# Patient Record
Sex: MALE | Race: OTHER | NOT HISPANIC OR LATINO | URBAN - METROPOLITAN AREA
[De-identification: names, ages, dates, MRNs, and addresses within clinical notes are randomized per-mention and may not be internally consistent; named-entity substitution may affect disease eponyms.]

---

## 2023-04-07 ENCOUNTER — APPOINTMENT (OUTPATIENT)
Dept: LAB | Facility: CLINIC | Age: 63
End: 2023-04-07

## 2023-04-07 DIAGNOSIS — E55.9 VITAMIN D DEFICIENCY: ICD-10-CM

## 2023-04-07 DIAGNOSIS — I10 BENIGN HYPERTENSION: ICD-10-CM

## 2023-04-07 DIAGNOSIS — E78.2 MIXED HYPERLIPIDEMIA: ICD-10-CM

## 2023-04-07 DIAGNOSIS — Z12.5 SPECIAL SCREENING FOR MALIGNANT NEOPLASM OF PROSTATE: ICD-10-CM

## 2023-04-07 LAB
25(OH)D3 SERPL-MCNC: 13 NG/ML (ref 30–100)
ALBUMIN SERPL BCP-MCNC: 3.9 G/DL (ref 3.5–5)
ALP SERPL-CCNC: 59 U/L (ref 46–116)
ALT SERPL W P-5'-P-CCNC: 27 U/L (ref 12–78)
ANION GAP SERPL CALCULATED.3IONS-SCNC: -1 MMOL/L (ref 4–13)
AST SERPL W P-5'-P-CCNC: 28 U/L (ref 5–45)
BASOPHILS # BLD AUTO: 0.02 THOUSANDS/ÂΜL (ref 0–0.1)
BASOPHILS NFR BLD AUTO: 1 % (ref 0–1)
BILIRUB SERPL-MCNC: 0.99 MG/DL (ref 0.2–1)
BUN SERPL-MCNC: 18 MG/DL (ref 5–25)
CALCIUM SERPL-MCNC: 8.9 MG/DL (ref 8.3–10.1)
CHLORIDE SERPL-SCNC: 106 MMOL/L (ref 96–108)
CHOLEST SERPL-MCNC: 168 MG/DL
CO2 SERPL-SCNC: 29 MMOL/L (ref 21–32)
CREAT SERPL-MCNC: 0.95 MG/DL (ref 0.6–1.3)
EOSINOPHIL # BLD AUTO: 0.06 THOUSAND/ÂΜL (ref 0–0.61)
EOSINOPHIL NFR BLD AUTO: 2 % (ref 0–6)
ERYTHROCYTE [DISTWIDTH] IN BLOOD BY AUTOMATED COUNT: 13 % (ref 11.6–15.1)
GFR SERPL CREATININE-BSD FRML MDRD: 84 ML/MIN/1.73SQ M
GLUCOSE P FAST SERPL-MCNC: 101 MG/DL (ref 65–99)
HCT VFR BLD AUTO: 46.7 % (ref 36.5–49.3)
HDLC SERPL-MCNC: 41 MG/DL
HGB BLD-MCNC: 15 G/DL (ref 12–17)
IMM GRANULOCYTES # BLD AUTO: 0.01 THOUSAND/UL (ref 0–0.2)
IMM GRANULOCYTES NFR BLD AUTO: 0 % (ref 0–2)
LDLC SERPL CALC-MCNC: 107 MG/DL (ref 0–100)
LYMPHOCYTES # BLD AUTO: 1.05 THOUSANDS/ÂΜL (ref 0.6–4.47)
LYMPHOCYTES NFR BLD AUTO: 28 % (ref 14–44)
MCH RBC QN AUTO: 29.9 PG (ref 26.8–34.3)
MCHC RBC AUTO-ENTMCNC: 32.1 G/DL (ref 31.4–37.4)
MCV RBC AUTO: 93 FL (ref 82–98)
MONOCYTES # BLD AUTO: 0.43 THOUSAND/ÂΜL (ref 0.17–1.22)
MONOCYTES NFR BLD AUTO: 12 % (ref 4–12)
NEUTROPHILS # BLD AUTO: 2.14 THOUSANDS/ÂΜL (ref 1.85–7.62)
NEUTS SEG NFR BLD AUTO: 57 % (ref 43–75)
NONHDLC SERPL-MCNC: 127 MG/DL
NRBC BLD AUTO-RTO: 0 /100 WBCS
PLATELET # BLD AUTO: 163 THOUSANDS/UL (ref 149–390)
PMV BLD AUTO: 11.3 FL (ref 8.9–12.7)
POTASSIUM SERPL-SCNC: 4.4 MMOL/L (ref 3.5–5.3)
PROT SERPL-MCNC: 7.4 G/DL (ref 6.4–8.4)
PSA SERPL-MCNC: 1.4 NG/ML (ref 0–4)
RBC # BLD AUTO: 5.01 MILLION/UL (ref 3.88–5.62)
SODIUM SERPL-SCNC: 134 MMOL/L (ref 135–147)
TRIGL SERPL-MCNC: 101 MG/DL
WBC # BLD AUTO: 3.71 THOUSAND/UL (ref 4.31–10.16)

## 2024-03-14 ENCOUNTER — APPOINTMENT (OUTPATIENT)
Dept: LAB | Facility: CLINIC | Age: 64
End: 2024-03-14

## 2024-03-22 ENCOUNTER — LAB (OUTPATIENT)
Dept: LAB | Facility: CLINIC | Age: 64
End: 2024-03-22
Payer: MEDICARE

## 2024-03-22 DIAGNOSIS — A04.8 H. PYLORI INFECTION: ICD-10-CM

## 2024-03-22 DIAGNOSIS — I10 BENIGN HYPERTENSION: ICD-10-CM

## 2024-03-22 DIAGNOSIS — E78.2 MIXED HYPERLIPIDEMIA: ICD-10-CM

## 2024-03-22 DIAGNOSIS — E04.1 THYROID NODULE: ICD-10-CM

## 2024-03-22 LAB
ALBUMIN SERPL BCP-MCNC: 4.4 G/DL (ref 3.5–5)
ALP SERPL-CCNC: 58 U/L (ref 34–104)
ALT SERPL W P-5'-P-CCNC: 20 U/L (ref 7–52)
ANION GAP SERPL CALCULATED.3IONS-SCNC: 6 MMOL/L (ref 4–13)
AST SERPL W P-5'-P-CCNC: 28 U/L (ref 13–39)
BASOPHILS # BLD AUTO: 0.02 THOUSANDS/ÂΜL (ref 0–0.1)
BASOPHILS NFR BLD AUTO: 1 % (ref 0–1)
BILIRUB SERPL-MCNC: 1.02 MG/DL (ref 0.2–1)
BUN SERPL-MCNC: 16 MG/DL (ref 5–25)
CALCIUM SERPL-MCNC: 9.3 MG/DL (ref 8.4–10.2)
CHLORIDE SERPL-SCNC: 102 MMOL/L (ref 96–108)
CHOLEST SERPL-MCNC: 186 MG/DL
CK SERPL-CCNC: 248 U/L (ref 39–308)
CO2 SERPL-SCNC: 32 MMOL/L (ref 21–32)
CREAT SERPL-MCNC: 0.85 MG/DL (ref 0.6–1.3)
EOSINOPHIL # BLD AUTO: 0.1 THOUSAND/ÂΜL (ref 0–0.61)
EOSINOPHIL NFR BLD AUTO: 2 % (ref 0–6)
ERYTHROCYTE [DISTWIDTH] IN BLOOD BY AUTOMATED COUNT: 13.1 % (ref 11.6–15.1)
GFR SERPL CREATININE-BSD FRML MDRD: 91 ML/MIN/1.73SQ M
GLUCOSE P FAST SERPL-MCNC: 105 MG/DL (ref 65–99)
HCT VFR BLD AUTO: 48.7 % (ref 36.5–49.3)
HDLC SERPL-MCNC: 51 MG/DL
HGB BLD-MCNC: 15.9 G/DL (ref 12–17)
IMM GRANULOCYTES # BLD AUTO: 0 THOUSAND/UL (ref 0–0.2)
IMM GRANULOCYTES NFR BLD AUTO: 0 % (ref 0–2)
LDLC SERPL CALC-MCNC: 106 MG/DL (ref 0–100)
LYMPHOCYTES # BLD AUTO: 1.26 THOUSANDS/ÂΜL (ref 0.6–4.47)
LYMPHOCYTES NFR BLD AUTO: 30 % (ref 14–44)
MCH RBC QN AUTO: 30.8 PG (ref 26.8–34.3)
MCHC RBC AUTO-ENTMCNC: 32.6 G/DL (ref 31.4–37.4)
MCV RBC AUTO: 94 FL (ref 82–98)
MONOCYTES # BLD AUTO: 0.36 THOUSAND/ÂΜL (ref 0.17–1.22)
MONOCYTES NFR BLD AUTO: 9 % (ref 4–12)
NEUTROPHILS # BLD AUTO: 2.42 THOUSANDS/ÂΜL (ref 1.85–7.62)
NEUTS SEG NFR BLD AUTO: 58 % (ref 43–75)
NONHDLC SERPL-MCNC: 135 MG/DL
NRBC BLD AUTO-RTO: 0 /100 WBCS
PLATELET # BLD AUTO: 169 THOUSANDS/UL (ref 149–390)
PMV BLD AUTO: 11.1 FL (ref 8.9–12.7)
POTASSIUM SERPL-SCNC: 4.1 MMOL/L (ref 3.5–5.3)
PROT SERPL-MCNC: 7.2 G/DL (ref 6.4–8.4)
RBC # BLD AUTO: 5.17 MILLION/UL (ref 3.88–5.62)
SODIUM SERPL-SCNC: 140 MMOL/L (ref 135–147)
TRIGL SERPL-MCNC: 144 MG/DL
TSH SERPL DL<=0.05 MIU/L-ACNC: 0.57 UIU/ML (ref 0.45–4.5)
WBC # BLD AUTO: 4.16 THOUSAND/UL (ref 4.31–10.16)

## 2024-03-22 PROCEDURE — 80053 COMPREHEN METABOLIC PANEL: CPT

## 2024-03-22 PROCEDURE — 84443 ASSAY THYROID STIM HORMONE: CPT

## 2024-03-22 PROCEDURE — 87338 HPYLORI STOOL AG IA: CPT

## 2024-03-22 PROCEDURE — 82550 ASSAY OF CK (CPK): CPT

## 2024-03-22 PROCEDURE — 80061 LIPID PANEL: CPT

## 2024-03-22 PROCEDURE — 36415 COLL VENOUS BLD VENIPUNCTURE: CPT

## 2024-03-22 PROCEDURE — 85025 COMPLETE CBC W/AUTO DIFF WBC: CPT

## 2024-03-23 LAB — H PYLORI AG STL QL IA: NEGATIVE

## 2024-10-14 ENCOUNTER — LAB (OUTPATIENT)
Dept: LAB | Facility: HOSPITAL | Age: 64
End: 2024-10-14
Payer: MEDICARE

## 2024-10-14 DIAGNOSIS — I10 BENIGN HYPERTENSION: ICD-10-CM

## 2024-10-14 DIAGNOSIS — E78.2 MIXED HYPERLIPIDEMIA: Primary | ICD-10-CM

## 2024-10-14 LAB
ALBUMIN SERPL BCG-MCNC: 4.4 G/DL (ref 3.5–5)
ALP SERPL-CCNC: 57 U/L (ref 34–104)
ALT SERPL W P-5'-P-CCNC: 16 U/L (ref 7–52)
ANION GAP SERPL CALCULATED.3IONS-SCNC: 8 MMOL/L (ref 4–13)
AST SERPL W P-5'-P-CCNC: 20 U/L (ref 13–39)
BASOPHILS # BLD AUTO: 0.03 THOUSANDS/ΜL (ref 0–0.1)
BASOPHILS NFR BLD AUTO: 1 % (ref 0–1)
BILIRUB SERPL-MCNC: 0.58 MG/DL (ref 0.2–1)
BUN SERPL-MCNC: 18 MG/DL (ref 5–25)
CALCIUM SERPL-MCNC: 9.2 MG/DL (ref 8.4–10.2)
CHLORIDE SERPL-SCNC: 103 MMOL/L (ref 96–108)
CHOLEST SERPL-MCNC: 179 MG/DL
CK SERPL-CCNC: 140 U/L (ref 39–308)
CO2 SERPL-SCNC: 29 MMOL/L (ref 21–32)
CREAT SERPL-MCNC: 0.88 MG/DL (ref 0.6–1.3)
EOSINOPHIL # BLD AUTO: 0.1 THOUSAND/ΜL (ref 0–0.61)
EOSINOPHIL NFR BLD AUTO: 2 % (ref 0–6)
ERYTHROCYTE [DISTWIDTH] IN BLOOD BY AUTOMATED COUNT: 13.2 % (ref 11.6–15.1)
GFR SERPL CREATININE-BSD FRML MDRD: 89 ML/MIN/1.73SQ M
GLUCOSE P FAST SERPL-MCNC: 113 MG/DL (ref 65–99)
HCT VFR BLD AUTO: 45.4 % (ref 36.5–49.3)
HDLC SERPL-MCNC: 51 MG/DL
HGB BLD-MCNC: 15.7 G/DL (ref 12–17)
IMM GRANULOCYTES # BLD AUTO: 0.01 THOUSAND/UL (ref 0–0.2)
IMM GRANULOCYTES NFR BLD AUTO: 0 % (ref 0–2)
LDLC SERPL CALC-MCNC: 106 MG/DL (ref 0–100)
LYMPHOCYTES # BLD AUTO: 1.5 THOUSANDS/ΜL (ref 0.6–4.47)
LYMPHOCYTES NFR BLD AUTO: 34 % (ref 14–44)
MCH RBC QN AUTO: 31.2 PG (ref 26.8–34.3)
MCHC RBC AUTO-ENTMCNC: 34.6 G/DL (ref 31.4–37.4)
MCV RBC AUTO: 90 FL (ref 82–98)
MONOCYTES # BLD AUTO: 0.41 THOUSAND/ΜL (ref 0.17–1.22)
MONOCYTES NFR BLD AUTO: 9 % (ref 4–12)
NEUTROPHILS # BLD AUTO: 2.36 THOUSANDS/ΜL (ref 1.85–7.62)
NEUTS SEG NFR BLD AUTO: 54 % (ref 43–75)
NONHDLC SERPL-MCNC: 128 MG/DL
NRBC BLD AUTO-RTO: 0 /100 WBCS
PLATELET # BLD AUTO: 158 THOUSANDS/UL (ref 149–390)
PMV BLD AUTO: 10.8 FL (ref 8.9–12.7)
POTASSIUM SERPL-SCNC: 4.1 MMOL/L (ref 3.5–5.3)
PROT SERPL-MCNC: 7.3 G/DL (ref 6.4–8.4)
RBC # BLD AUTO: 5.04 MILLION/UL (ref 3.88–5.62)
SODIUM SERPL-SCNC: 140 MMOL/L (ref 135–147)
TRIGL SERPL-MCNC: 109 MG/DL
WBC # BLD AUTO: 4.41 THOUSAND/UL (ref 4.31–10.16)

## 2024-10-14 PROCEDURE — 85025 COMPLETE CBC W/AUTO DIFF WBC: CPT

## 2024-10-14 PROCEDURE — 36415 COLL VENOUS BLD VENIPUNCTURE: CPT

## 2024-10-14 PROCEDURE — 80061 LIPID PANEL: CPT

## 2024-10-14 PROCEDURE — 80053 COMPREHEN METABOLIC PANEL: CPT

## 2024-10-14 PROCEDURE — 82550 ASSAY OF CK (CPK): CPT

## 2025-01-30 ENCOUNTER — APPOINTMENT (OUTPATIENT)
Dept: RADIOLOGY | Facility: OTHER | Age: 65
End: 2025-01-30
Payer: MEDICARE

## 2025-01-30 VITALS — HEIGHT: 69 IN | WEIGHT: 206 LBS | BODY MASS INDEX: 30.51 KG/M2

## 2025-01-30 DIAGNOSIS — M25.511 RIGHT SHOULDER PAIN, UNSPECIFIED CHRONICITY: ICD-10-CM

## 2025-01-30 PROCEDURE — 73030 X-RAY EXAM OF SHOULDER: CPT

## 2025-01-30 PROCEDURE — 99203 OFFICE O/P NEW LOW 30 MIN: CPT | Performed by: FAMILY MEDICINE

## 2025-01-30 NOTE — PATIENT INSTRUCTIONS
Patient advised that his physical exam and history suggest a possible torn rotator cuff vs tendinitis. Patient showed mild weakness on his abduction, empty can, and external rotation. Patient advised to follow up after MRI results. Pending his results he may require surgery vs conservative management.

## 2025-01-30 NOTE — PROGRESS NOTES
1. Right shoulder pain, unspecified chronicity  Ambulatory Referral to Orthopedic Surgery    XR shoulder 2+ vw right    MRI shoulder right wo contrast        Orders Placed This Encounter   Procedures    XR shoulder 2+ vw right    MRI shoulder right wo contrast        IMAGING STUDIES: (I personally reviewed images in PACS and report):  - 1/30/2025 Rt shoulder: No abnormal findings and no OA.    PAST REPORTS:    ASSESSMENT/PLAN:  Right shoulder pain  -- Possible torn rotator cuff vs cuff tendinitis    Repeat X-ray next visit: None    Return for Follow-up after MRI is completed for review.    Patient instructions below verbally summarized in person during encounter:  Patient Instructions   Patient advised that his physical exam and history suggest a possible torn rotator cuff vs tendinitis. Patient showed mild weakness on his abduction, empty can, and external rotation. Patient advised to follow up after MRI results. Pending his results he may require surgery vs conservative management.      __________________________________________________________________________    HISTORY OF PRESENT ILLNESS:  Patient presents with atraumatic right shoulder pain that began 2 months ago. Patient has never had this pain before. 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 Patient has been taking naproxen BID and feels that it is improving his pain. Patient does not feel any numbness or tingling in his arm. Patient feels his pain has been improving only because of his naproxen. The improvement was in terms of being able to lift his arm on his own. Before the naproxen he couldn't eat with a spoon. Patient states the pain initially came on gradual.      Review of Systems      Following history reviewed and update:    No past medical history on file.  No past surgical history on file.  Social History   Social History     Substance and Sexual Activity   Alcohol Use Not on file     Social History     Substance and Sexual  "Activity   Drug Use Not on file     Social History     Tobacco Use   Smoking Status Never   Smokeless Tobacco Never     No family history on file.  No Known Allergies       Physical Exam  Ht 5' 9\" (1.753 m)   Wt 93.4 kg (206 lb)   BMI 30.42 kg/m²         Ortho Exam  RIGHT SHOULDER:  Erythema: no  Swelling: no  Increased Warmth: no    Tenderness: Bicipital groove    ROM  Touchdown sign: intact  External Rotation: Intact  Internal Rotation: Intact    Strength  Abduction: 3+/5  ER: 3+/5  IR: 5/5    Drop-Arm: negative  Emptycan: Positive    Gregory: negative  Neer: negative  Cross-Arm: Positive  Speeds: positive      LEFT SHOULDER:  Strength  Abduction: 5/5  ER: 5/5  IR: 5/5    ROM  Touchdown sign: intact    Empty can: negative   __________________________________________________________________________  Procedures                   "

## 2025-02-07 ENCOUNTER — HOSPITAL ENCOUNTER (OUTPATIENT)
Dept: MRI IMAGING | Facility: HOSPITAL | Age: 65
End: 2025-02-07
Payer: MEDICARE

## 2025-02-07 DIAGNOSIS — M25.511 RIGHT SHOULDER PAIN, UNSPECIFIED CHRONICITY: ICD-10-CM

## 2025-02-07 PROCEDURE — 73221 MRI JOINT UPR EXTREM W/O DYE: CPT

## 2025-02-20 ENCOUNTER — OFFICE VISIT (OUTPATIENT)
Dept: OBGYN CLINIC | Facility: OTHER | Age: 65
End: 2025-02-20
Payer: MEDICARE

## 2025-02-20 VITALS — BODY MASS INDEX: 30.51 KG/M2 | WEIGHT: 206 LBS | HEIGHT: 69 IN

## 2025-02-20 DIAGNOSIS — M75.111 INCOMPLETE TEAR OF RIGHT ROTATOR CUFF, UNSPECIFIED WHETHER TRAUMATIC: Primary | ICD-10-CM

## 2025-02-20 PROCEDURE — 99213 OFFICE O/P EST LOW 20 MIN: CPT | Performed by: FAMILY MEDICINE

## 2025-02-20 NOTE — PATIENT INSTRUCTIONS
I explained the patient and family that he has full-thickness rotator cuff tear with weakness on examination as well as mild retraction of fibers.  I explained treatment options including PRP injection as well as rehabilitation I explained that this may not result in curative treatment and that he may have permanent weakness to some extent without surgical invention.  At this time I recommended discussion with orthopedic surgeon to review full treatment options including more invasive intervention before proceeding with PRP injection especially in the setting of language barrier.  Patient's family expressed understanding and agreed to plan.  If patient decides against surgery after consultation and he may return to my practice for ultrasound-guided PRP injection.

## 2025-02-20 NOTE — PROGRESS NOTES
1. Incomplete tear of right rotator cuff, unspecified whether traumatic  Ambulatory Referral to Orthopedic Surgery        Orders Placed This Encounter   Procedures    Ambulatory Referral to Orthopedic Surgery        IMAGING STUDIES: (I personally reviewed images in PACS and report):  MRI Right shoulder 2/7/25:  Full-thickness tear of the distal anterior fibers of supraspinatus tendon with mild retraction. No muscle atrophy.     Low-grade, partial-thickness, intrasubstance tear of the subscapularis tendon.     Thickened of the inferior glenohumeral ligament may represent adhesive capsulitis/frozen shoulder in the appropriate clinical setting.     Mild subacromial/subdeltoid bursal fluid      PAST REPORTS:        ASSESSMENT/PLAN:  Right shoulder full-thickness supraspinatus tendon tear with mild retraction  4- out of 5 weakness on abduction and external rotation  Language barrier    Repeat X-ray next visit: None    Return for Follow-up with Surgeon.    Patient instructions below verbally summarized in person during encounter:  Patient Instructions   I explained the patient and family that he has full-thickness rotator cuff tear with weakness on examination as well as mild retraction of fibers.  I explained treatment options including PRP injection as well as rehabilitation I explained that this may not result in curative treatment and that he may have permanent weakness to some extent without surgical invention.  At this time I recommended discussion with orthopedic surgeon to review full treatment options including more invasive intervention before proceeding with PRP injection especially in the setting of language barrier.  Patient's family expressed understanding and agreed to plan.  If patient decides against surgery after consultation and he may return to my practice for ultrasound-guided PRP injection.      __________________________________________________________________________    HISTORY OF PRESENT  "ILLNESS:  Follow-up evaluation for right shoulder pain.  Last visit patient had weakness on examination and therefore MRI was ordered to confirm tendinitis versus cuff tear.  MRI did demonstrate full-thickness cuff tear with mild retraction of fibers without atrophy indicative of acute tear.  He does continue to have weakness on examination as well as pain.  There is language barrier in the office interpretation by his wife as well as friend speaking Nepali.      Review of Systems      Following history reviewed and update:    No past medical history on file.  No past surgical history on file.  Social History   Social History     Substance and Sexual Activity   Alcohol Use Not on file     Social History     Substance and Sexual Activity   Drug Use Not on file     Social History     Tobacco Use   Smoking Status Never   Smokeless Tobacco Never     No family history on file.  No Known Allergies       Physical Exam  Ht 5' 9\" (1.753 m)   Wt 93.4 kg (206 lb)   BMI 30.42 kg/m²         Ortho Exam  RIGHT SHOULDER:  Tenderness: None    ROM  Touchdown sign: intact  External Rotation: Intact  Internal Rotation: Intact    Strength  Abduction: 4-/5  ER: 4-/5  IR: 5/5    Drop-Arm: negative  Emptycan: +  Belly Press:   Lift-off Test:    Bri: +  Neer: +  Cross-Arm:   Speeds:     __________________________________________________________________________  Procedures                  "

## 2025-03-04 ENCOUNTER — OFFICE VISIT (OUTPATIENT)
Dept: OBGYN CLINIC | Facility: MEDICAL CENTER | Age: 65
End: 2025-03-04
Payer: MEDICARE

## 2025-03-04 VITALS — HEIGHT: 69 IN | BODY MASS INDEX: 30.81 KG/M2 | WEIGHT: 208 LBS

## 2025-03-04 DIAGNOSIS — M75.111 INCOMPLETE TEAR OF RIGHT ROTATOR CUFF, UNSPECIFIED WHETHER TRAUMATIC: Primary | ICD-10-CM

## 2025-03-04 PROCEDURE — 99213 OFFICE O/P EST LOW 20 MIN: CPT | Performed by: ORTHOPAEDIC SURGERY

## 2025-03-04 NOTE — PROGRESS NOTES
Orthopedics Sports Medicine Shoulder New Patient Visit    Name: Troy Suárez      : 1960      MRN: [id number]  Encounter Provider: Baldo Medeiros DO  Encounter Date: 3/4/2025   Encounter department: [facility]  :  Assessment & Plan  Incomplete tear of right rotator cuff, unspecified whether traumatic    Orders:    Ambulatory Referral to Orthopedic Surgery    Ambulatory Referral to Physical Therapy; Future           Assesment:   64 y.o. male right shoulder full thickness anterior supraspinatus tendon tear and intra-substance biceps tendon tearing.    Plan:    Conservative treatment:    Ice to shoulder 1-2 times daily, for 20 minutes at a time.  PT for ROM and strengthening to shoulder, rotator cuff, scapular stabilizers. Script provided.    Imaging:    All imaging from today was reviewed by myself and explained to the patient.       Injection:    No Injection planned at this time.      Surgery:     No surgery is recommended at this point, continue with conservative treatment plan as noted.  We discussed surgical intervention in the form of RTC repair. Patient wishes to try physical therapy first. Given minimal retraction to humeral head anticipate this is okay to try first.      Follow up:    No follow-ups on file.      History of Present Illness   HPI  Chief Complaint   Patient presents with    Right Shoulder - Pain     Daughter is with Pt.       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 Cole, seen in clinic for consultation of right shoulder pain.  Patient presents today with his daughter provided Sami translation throughout today's visit.    The patient denies a history of diabetes.  The patient denies a history of thyroid disorder.      Pain is located anterior, lateral.  Pain does radiate down the biceps.  the patient rates the pain as a 3/10.  The  pain has been present for 3 months.  Patient feels his shoulder pain has slowly been improving and is hoping to avoid surgery.    Patient reports chronic right shoulder pain he is unsure of any injury or trauma to the shoulder.  The pain is characterized as dull, achy.  The pain is present daily.      Pain is improved by rest and ibuprofen.  Pain is aggravated by overhead activity and sleeping.    Symptoms include pain.    The patient denies weakness.  The patient denies numbness and tingling.     The patient has tried rest and ibuprofen.  Patient has not initiated formal therapy or had CSI to the right shoulder at this time. Patient is currently working full duty.      Shoulder Surgical History:  None    Past Medical, Social and Family History:  History reviewed. No pertinent past medical history.  History reviewed. No pertinent surgical history.  No Known Allergies  Current Outpatient Medications on File Prior to Visit   Medication Sig Dispense Refill    aspirin (Aspirin Low Dose) 81 mg EC tablet Take 1 tablet (81 mg total) by mouth daily 90 tablet 3    bisoprolol-hydrochlorothiazide (ZIAC) 2.5-6.25 MG per tablet Take 1 tablet by mouth daily 90 tablet 3    ergocalciferol (VITAMIN D2) 50,000 units TAKE 1 CAPSULE BY MOUTH ONCE A WEEK 12 capsule 2    fexofenadine (ALLEGRA) 180 MG tablet Take 1 tablet (180 mg total) by mouth daily 90 tablet 3    naproxen (NAPROSYN) 500 mg tablet TAKE 1 TABLET(500 MG) BY MOUTH TWICE DAILY WITH MEALS 30 tablet 0    rosuvastatin (CRESTOR) 10 MG tablet TAKE 1 TABLET(10 MG) BY MOUTH DAILY 90 tablet 3    omeprazole (PriLOSEC) 20 mg delayed release capsule TAKE 1 CAPSULE BY MOUTH TWICE DAILY (Patient not taking: Reported on 3/4/2025) 28 capsule 0     No current facility-administered medications on file prior to visit.     Social History     Socioeconomic History    Marital status: /Civil Union     Spouse name: Not on file    Number of children: Not on file    Years of education: Not  "on file    Highest education level: Not on file   Occupational History    Not on file   Tobacco Use    Smoking status: Never    Smokeless tobacco: Never   Vaping Use    Vaping status: Never Used   Substance and Sexual Activity    Alcohol use: Never    Drug use: Never    Sexual activity: Not on file   Other Topics Concern    Not on file   Social History Narrative    Not on file     Social Drivers of Health     Financial Resource Strain: Not on file   Food Insecurity: Not on file   Transportation Needs: Not on file   Physical Activity: Not on file   Stress: Not on file   Social Connections: Not on file   Intimate Partner Violence: Not on file   Housing Stability: Not on file         I have reviewed the past medical, surgical, social and family history, medications and allergies as documented in the EMR.    Review of systems: ROS is negative other than that noted in the HPI.  Constitutional: Negative for fatigue and fever.   HENT: Negative for sore throat.    Respiratory: Negative for shortness of breath.    Cardiovascular: Negative for chest pain.   Gastrointestinal: Negative for abdominal pain.   Endocrine: Negative for cold intolerance and heat intolerance.   Genitourinary: Negative for flank pain.   Musculoskeletal: Negative for back pain.   Skin: Negative for rash.   Allergic/Immunologic: Negative for immunocompromised state.   Neurological: Negative for dizziness.   Psychiatric/Behavioral: Negative for agitation.      Objective   Ht 5' 9\" (1.753 m)   Wt 94.3 kg (208 lb)   BMI 30.72 kg/m²     Physical Exam:    Height 5' 9\" (1.753 m), weight 94.3 kg (208 lb).    General/Constitutional: NAD, well developed, well nourished  HENT: Normocephalic, atraumatic  CV: Intact distal pulses, regular rate  Resp: No respiratory distress or labored breathing  GI: Soft and non-tender   Lymphatic: No lymphadenopathy palpated  Neuro: Alert and Oriented x 3, no focal deficits  Psych: Normal mood, normal affect, normal judgement, " normal behavior  Skin: Warm, dry, no rashes, no erythema      Shoulder focused exam:       RIGHT LEFT    Scapula Atrophy Negative Negative     Winging Negative Negative     Protraction Negative Negative    Rotator cuff SS 5/5 5/5     IS 5/5 5/5     SubS 5/5 5/5    ROM     170     ER0 40 30     ER90 90    90     IR90 T6    T6     IRb T6    T6    TTP: AC Negative Negative     Biceps Positive Negative     Coracoid Negative Negative    Special Tests: O'Briens Negative Negative     Brian-shear Negative Negative     Cross body Adduction Negative Negative     Speeds  Positive Negative     Walter's Negative w/ pain Negative     Whipple Negative w/ pain Negative       Neer Negative Negative     Gregory Negative Negative    Instability: Apprehension & relocation not tested not tested     Load & shift not tested not tested    Other: Crank Negative Negative      Negative Regan.           UE NV Exam: +2 Radial pulses bilaterally  Sensation intact to light touch C5-T1 bilaterally, Radial/median/ulnar nerve motor intact      Bilateral elbow, wrist, and and forearm ROM full, painless with passive ROM, no ttp or crepitance throughout extremities below shoulder joint    Cervical ROM is full without pain, numbness or tingling      Shoulder Imaging    X-rays of the right shoulder from 1/30/2025 were reviewed which demonstrates no acute osseous abnormality.  No significant degenerative changes.  I reviewed the radiology report and agree with their impression.    MRI of the right shoulder from 2/7/2025 was reviewed which demonstrates full-thickness tear of the distal anterior fibers of the supraspinatus tendon with mild action, no muscle atrophy.  Low-grade partial-thickness intrasubstance tearing of the subscapularis tendon.  Thickening of the inferior glenohumeral ligament representative of adhesive capsulitis in the appropriate clinical setting.  Mild subacromial/subdeltoid bursitis.  I reviewed the radiology report and agree with  their impression.      Scribe Attestation      I,:  Vita Acosta am acting as a scribe while in the presence of the attending physician.:       I,:  Baldo Medeiros, DO personally performed the services described in this documentation    as scribed in my presence.:

## 2025-03-20 ENCOUNTER — EVALUATION (OUTPATIENT)
Dept: PHYSICAL THERAPY | Facility: REHABILITATION | Age: 65
End: 2025-03-20
Payer: MEDICARE

## 2025-03-20 DIAGNOSIS — M75.111 INCOMPLETE TEAR OF RIGHT ROTATOR CUFF, UNSPECIFIED WHETHER TRAUMATIC: ICD-10-CM

## 2025-03-20 DIAGNOSIS — G89.29 CHRONIC RIGHT SHOULDER PAIN: Primary | ICD-10-CM

## 2025-03-20 DIAGNOSIS — M25.511 CHRONIC RIGHT SHOULDER PAIN: Primary | ICD-10-CM

## 2025-03-20 PROCEDURE — 97161 PT EVAL LOW COMPLEX 20 MIN: CPT | Performed by: PHYSICAL THERAPIST

## 2025-03-20 PROCEDURE — 97112 NEUROMUSCULAR REEDUCATION: CPT | Performed by: PHYSICAL THERAPIST

## 2025-03-20 NOTE — PROGRESS NOTES
PT Evaluation     Today's date: 3/20/2025  Patient name: Troy Suárez  : 1960  MRN: [id number]  Referring provider: Baldo Medeiros DO  Dx:   Encounter Diagnosis     ICD-10-CM    1. Chronic right shoulder pain  M25.511     G89.29       2. Incomplete tear of right rotator cuff, unspecified whether traumatic  M75.111 Ambulatory Referral to Physical Therapy                     Assessment  Impairments: abnormal coordination, abnormal gait, abnormal muscle firing, abnormal muscle tone, abnormal or restricted ROM, abnormal movement, activity intolerance, impaired balance, impaired physical strength, lacks appropriate home exercise program, pain with function, weight-bearing intolerance, poor posture , poor body mechanics, participation limitations, activity limitations and endurance  Symptom irritability: moderate    Assessment details: Troy Suárez is a 64 year-old male who presents to physical therapy with a chief complaint of chronic right shoulder pain. Patient did consult with Dr. Medeiros in Orthopedic Surgery. Current does have surprispinatus full-thickness tear. Presents today with decreased range of motion, strength, and functional UE use. The patient would benefit from skilled PT to address his current deficits, improve his QOL, and restore his PLOF. No further referral is warranted at this time based upon examination results.   Understanding of Dx/Px/POC: good     Prognosis: good    Goals  Impairment Based Goals:  1. Decreased pain by 50% in 4 weeks.  2. Improve ROM to WFL by discharge.   3. Improve strength by 1/2 measure in 6 weeks.   4. Improve FOTO greater than predicted outcome score by discharge.      Functional Based Goals: To be met upon discharge  1. The patient will demonstrate independence with UE ADLs.   2. Patient will be fully independent with HEP by discharge  3. Patient will be able to manage symptoms independently.       Plan  Patient would benefit from: skilled physical  therapy  Referral necessary: No    Planned therapy interventions: abdominal trunk stabilization, activity modification, balance, balance/weight bearing training, behavior modification, body mechanics training, breathing training, coordination, compression, flexibility, functional ROM exercises, graded exercise, graded activity, home exercise program, graded motor, therapeutic exercise, therapeutic activities, stretching, strengthening, self care, postural training, patient/caregiver education, neuromuscular re-education, nerve gliding, motor coordination training, manual therapy, kinesiology taping, joint mobilization and IASTM    Frequency: 2x week  Plan of Care beginning date: 3/20/2025  Plan of Care expiration date: 5/15/2025        Subjective Evaluation    History of Present Illness  Mechanism of injury: I have been having pain for the past 3 months. At first, I could not lift the arm, but I have noticed that it has been getting better. I can use it pretty well, but I do get pain using it. I did see the orthopedic and they did notice I have a tear in the right shoulder rotator cuff muscle. I did not do anything that caused the pain. 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 Overall I am sleeping pretty well, and the pain does not tend to get worse throughout the day. I have not had injuries to this shoulder before and no injuries anywhere else. No numbness tingling in my hand or arm. I do not feel that my strength has fully come back since it first started bothering me.           Not a recurrent problem   Patient Goals  Patient goals for therapy: decreased pain, improved balance, increased motion, return to sport/leisure activities, independence with ADLs/IADLs and increased strength    Pain  Current pain rating: 3  At best pain rating: 3  Relieving factors: relaxation, rest and change in position  Aggravating factors: lifting (carrying, pulling, pushing)    Treatments  Current  treatment: physical therapy      Objective     General Comments:      Shoulder Comments   R Shoulder AROM:   Flexion: 170 degrees  Abduction: 165 degrees  Functional ER: T1   Functional IR: L4 with pain     R Shoulder PROM:   Flexion: 180 degrees  Abduction: 160 degrees stopped due to pain   ER: 60 degrees stopped due to pain   IR: 70 degrees    Drop Arm: Negative  Belly Press: Negative     Palpation: No tenderness    R Shoulder Strength:   Flexion: 4/5   Abduction: 4/5   ER: 3+/5 with pain   IR: 5/5                  POC expires PT/OT + Visit Limit?   5/15/2025  BOMN       Visit/Unit Tracking  AUTH Status:  Date 3/20         Used 1         Remaining                Precautions: No outstanding precautions.      Manuals 3/20                                                                Neuro Re-Ed             Patient Ed 15'            Shoulder Isometrics             Rows             Shoulder Extensions             Wall Slides                                       Ther Ex             UBE             Pulleys             Seated Thoracic Rotations             BSER             Table Slides                                                    Ther Activity                                       Gait Training                                       Modalities

## 2025-03-24 ENCOUNTER — OFFICE VISIT (OUTPATIENT)
Dept: PHYSICAL THERAPY | Facility: REHABILITATION | Age: 65
End: 2025-03-24
Payer: MEDICARE

## 2025-03-24 DIAGNOSIS — M75.111 INCOMPLETE TEAR OF RIGHT ROTATOR CUFF, UNSPECIFIED WHETHER TRAUMATIC: Primary | ICD-10-CM

## 2025-03-24 DIAGNOSIS — G89.29 CHRONIC RIGHT SHOULDER PAIN: ICD-10-CM

## 2025-03-24 DIAGNOSIS — M25.511 CHRONIC RIGHT SHOULDER PAIN: ICD-10-CM

## 2025-03-24 PROCEDURE — 97112 NEUROMUSCULAR REEDUCATION: CPT

## 2025-03-24 PROCEDURE — 97110 THERAPEUTIC EXERCISES: CPT

## 2025-03-24 NOTE — PROGRESS NOTES
"Daily Note     Today's date: 3/24/2025  Patient name: Troy Suárez  : 1960  MRN: [id number]  Referring provider: Baldo Medeiros DO  Dx:   Encounter Diagnosis     ICD-10-CM    1. Incomplete tear of right rotator cuff, unspecified whether traumatic  M75.111       2. Chronic right shoulder pain  M25.511     G89.29                      Subjective: Pt states he has no pain, has no pain at home with activity      Objective: See treatment diary below      Assessment: Tolerated treatment well. Patient would benefit from continued PT.  Pt. able to complete all exercises with no increase in pain during or after session.  Pt requires some cuing for horacio with exercises, responds well to visual and verbal cues.  Continue to progress patient as appropriate.  Pt. 1:1 with PTA for entirety.      Plan: Continue per plan of care.        POC expires PT/OT + Visit Limit?   5/15/2025  BOMN       Visit/Unit Tracking  AUTH Status:  Date 3/20 3/24        Used 1 2        Remaining                Precautions: No outstanding precautions.      Manuals 3/20 3/24                                                               Neuro Re-Ed             Patient Ed 15'            Shoulder Isometrics  15x5\" f/ext/ER/IR/ABD           Rows  2x10 15#           Shoulder Extensions  2x10 13#           Wall Slides  10x10\"                                     Ther Ex             UBE             Pulleys  6'           Seated Thoracic Rotations  15x ea b/l           BSER  2x10 gtb           Table Slides  20x ea F/scap/ABD                                                  Ther Activity                                       Gait Training                                       Modalities                                            "

## 2025-03-26 ENCOUNTER — OFFICE VISIT (OUTPATIENT)
Dept: PHYSICAL THERAPY | Facility: REHABILITATION | Age: 65
End: 2025-03-26
Payer: MEDICARE

## 2025-03-26 DIAGNOSIS — M25.511 CHRONIC RIGHT SHOULDER PAIN: ICD-10-CM

## 2025-03-26 DIAGNOSIS — M75.111 INCOMPLETE TEAR OF RIGHT ROTATOR CUFF, UNSPECIFIED WHETHER TRAUMATIC: Primary | ICD-10-CM

## 2025-03-26 DIAGNOSIS — G89.29 CHRONIC RIGHT SHOULDER PAIN: ICD-10-CM

## 2025-03-26 PROCEDURE — 97110 THERAPEUTIC EXERCISES: CPT | Performed by: PHYSICAL THERAPIST

## 2025-03-26 PROCEDURE — 97112 NEUROMUSCULAR REEDUCATION: CPT | Performed by: PHYSICAL THERAPIST

## 2025-03-26 NOTE — PROGRESS NOTES
"Daily Note     Today's date: 3/26/2025  Patient name: Troy Suárez  : 1960  MRN: [id number]  Referring provider: Baldo Medeiros DO  Dx:   No diagnosis found.                 Subjective: Pt states he has no pain currently.      Objective: See treatment diary below      Assessment: Tolerated treatment well. Patient would benefit from continued PT.  No pain complaints during or after today's session.      Plan: Continue per plan of care.        POC expires PT/OT + Visit Limit?   5/15/2025  BOMN       Visit/Unit Tracking  AUTH Status:  Date 3/20 3/24 3/26       Used 1 2 3       Remaining                Precautions: No outstanding precautions.      Manuals 3/20 3/24 3/26                                                              Neuro Re-Ed             Patient Ed 15'            Shoulder Isometrics  15x5\" f/ext/ER/IR/ABD 5\"x15 f/ext/ER/IR/ABD          Rows  2x10 15# 2x10 15#          Shoulder Extensions  2x10 13# 2x10 13#          Wall Slides  10x10\" 10x10\"                                    Ther Ex             UBE   3'/3' 90 rpm          Pulleys  6' 6'          Seated Thoracic Rotations  15x ea b/l X15 ea b/l          BSER  2x10 gtb 2x10 tb          Table Slides  20x ea F/scap/ABD 20x ea F/scap/ABD                                                 Ther Activity                                       Gait Training                                       Modalities                                            "
"Daily Note     Today's date: 3/26/2025  Patient name: Troy Suárez  : 1960  MRN: [id number]  Referring provider: Baldo Medeiros DO  Dx:   Encounter Diagnosis     ICD-10-CM    1. Incomplete tear of right rotator cuff, unspecified whether traumatic  M75.111       2. Chronic right shoulder pain  M25.511     G89.29                      Subjective: ***      Objective: See treatment diary below      Assessment: Tolerated treatment {Tolerated treatment :2504779572}. Patient {assessment:9488408698}      Plan: {PLAN:7465358337}       POC expires PT/OT + Visit Limit?   5/15/2025  BOMN       Visit/Unit Tracking  AUTH Status:  Date 3/20 3/24        Used 1 2        Remaining                Precautions: No outstanding precautions.      Manuals 3/20 3/24                                                               Neuro Re-Ed             Patient Ed 15'            Shoulder Isometrics  15x5\" f/ext/ER/IR/ABD           Rows  2x10 15#           Shoulder Extensions  2x10 13#           Wall Slides  10x10\"                                     Ther Ex             UBE             Pulleys  6'           Seated Thoracic Rotations  15x ea b/l           BSER  2x10 gtb           Table Slides  20x ea F/scap/ABD                                                  Ther Activity                                       Gait Training                                       Modalities                                              "
denied

## 2025-04-01 ENCOUNTER — OFFICE VISIT (OUTPATIENT)
Dept: PHYSICAL THERAPY | Facility: REHABILITATION | Age: 65
End: 2025-04-01
Payer: MEDICARE

## 2025-04-01 DIAGNOSIS — G89.29 CHRONIC RIGHT SHOULDER PAIN: Primary | ICD-10-CM

## 2025-04-01 DIAGNOSIS — M25.511 CHRONIC RIGHT SHOULDER PAIN: Primary | ICD-10-CM

## 2025-04-01 PROCEDURE — 97110 THERAPEUTIC EXERCISES: CPT | Performed by: PHYSICAL THERAPIST

## 2025-04-01 PROCEDURE — 97112 NEUROMUSCULAR REEDUCATION: CPT | Performed by: PHYSICAL THERAPIST

## 2025-04-01 NOTE — PROGRESS NOTES
"Daily Note     Today's date: 2025  Patient name: Troy Suárez  : 1960  MRN: [id number]  Referring provider: Baldo Medeiros DO  Dx:   Encounter Diagnosis     ICD-10-CM    1. Chronic right shoulder pain  M25.511     G89.29                      Subjective: Shoulder is a bit sore but otherwise doing well.       Objective: See treatment diary below      Assessment: Tolerated treatment well. Patient continues to be appropriately challenged at current therapeutic volume and intensity. Patient would benefit from continued PT      Plan: Continue per plan of care.        POC expires PT/OT + Visit Limit?   5/15/2025  [facility]OMN       Visit/Unit Tracking  AUTH Status:  Date 3/20 3/24        Used 1 2        Remaining                Precautions: No outstanding precautions.      Manuals 3/20 3/24 4/1                                                              Neuro Re-Ed             Patient Ed 15'            Shoulder Isometrics  15x5\" f/ext/ER/IR/ABD 15x5\"          Rows  2x10 15# 2x10 15#          Shoulder Extensions  2x10 13# 2x10 13#          Wall Slides  10x10\" 10x10\"                                    Ther Ex             UBE   3/3          Pulleys  6' 6'          Seated Thoracic Rotations  15x ea b/l 15x ea B          BSER  2x10 gtb 2x10 gtb          Table Slides  20x ea F/scap/ABD 20x ea F/scap/ABD                                                 Ther Activity                                       Gait Training                                       Modalities                                              "

## 2025-04-03 ENCOUNTER — OFFICE VISIT (OUTPATIENT)
Dept: PHYSICAL THERAPY | Facility: REHABILITATION | Age: 65
End: 2025-04-03
Payer: MEDICARE

## 2025-04-03 DIAGNOSIS — M25.511 CHRONIC RIGHT SHOULDER PAIN: Primary | ICD-10-CM

## 2025-04-03 DIAGNOSIS — G89.29 CHRONIC RIGHT SHOULDER PAIN: Primary | ICD-10-CM

## 2025-04-03 DIAGNOSIS — M75.111 INCOMPLETE TEAR OF RIGHT ROTATOR CUFF, UNSPECIFIED WHETHER TRAUMATIC: ICD-10-CM

## 2025-04-03 PROCEDURE — 97110 THERAPEUTIC EXERCISES: CPT

## 2025-04-03 PROCEDURE — 97112 NEUROMUSCULAR REEDUCATION: CPT

## 2025-04-03 NOTE — PROGRESS NOTES
"Daily Note     Today's date: 4/3/2025  Patient name: Troy Suárez  : 1960  MRN: [id number]  Referring provider: Baldo Medeiros DO  Dx:   Encounter Diagnosis     ICD-10-CM    1. Chronic right shoulder pain  M25.511     G89.29       2. Incomplete tear of right rotator cuff, unspecified whether traumatic  M75.111                      Subjective: Pt reports that shoulder feels good today.  Pt states that soreness post session is improving.      Objective: See treatment diary below      Assessment: Tolerated treatment well. Patient would benefit from continued PT.  Pt had minor pain with Jazzy rows.  Pt. able to complete all other exercises with no increase in pain during or after session.  Pt would like to step down to 1x week + HEP.  Pt. 1:1 with PTA for entirety.      Plan: Continue per plan of care.        POC expires PT/OT + Visit Limit?   5/15/2025  [facility]       Visit/Unit Tracking  AUTH Status:  Date 3/20 3/24 4/3       Used 1 2 3       Remaining                Precautions: No outstanding precautions.      Manuals 3/20 3/24 4/1 4/3                                                             Neuro Re-Ed             Patient Ed 15'            Shoulder Isometrics  15x5\" f/ext/ER/IR/ABD 15x5\" 15x5\" ea         Rows  2x10 15# 2x10 15# 2x10 15#         Shoulder Extensions  2x10 13# 2x10 13# 2x10 13#         Wall Slides  10x10\" 10x10\" 10x10\"         Prone I/Y/T    2x10 ea RUE                      Ther Ex             UBE   3/3 3/3         Pulleys  6' 6'          Seated Thoracic Rotations  15x ea b/l 15x ea B 15x ea b/l         BSER  2x10 gtb 2x10 gtb 2x10 gtb         Table Slides  20x ea F/scap/ABD 20x ea F/scap/ABD 20x ea f/scap/abd                                                Ther Activity                                       Gait Training                                       Modalities                                                "

## 2025-04-08 ENCOUNTER — OFFICE VISIT (OUTPATIENT)
Dept: PHYSICAL THERAPY | Facility: REHABILITATION | Age: 65
End: 2025-04-08
Payer: MEDICARE

## 2025-04-08 DIAGNOSIS — G89.29 CHRONIC RIGHT SHOULDER PAIN: Primary | ICD-10-CM

## 2025-04-08 DIAGNOSIS — M75.111 INCOMPLETE TEAR OF RIGHT ROTATOR CUFF, UNSPECIFIED WHETHER TRAUMATIC: ICD-10-CM

## 2025-04-08 DIAGNOSIS — M25.511 CHRONIC RIGHT SHOULDER PAIN: Primary | ICD-10-CM

## 2025-04-08 PROCEDURE — 97110 THERAPEUTIC EXERCISES: CPT

## 2025-04-08 PROCEDURE — 97112 NEUROMUSCULAR REEDUCATION: CPT

## 2025-04-08 NOTE — PROGRESS NOTES
"Daily Note     Today's date: 2025  Patient name: Troy Sáurez  : 1960  MRN: [id number]  Referring provider: Baldo Medeiros DO  Dx:   Encounter Diagnosis     ICD-10-CM    1. Chronic right shoulder pain  M25.511     G89.29       2. Incomplete tear of right rotator cuff, unspecified whether traumatic  M75.111                      Subjective: Pt reported feeling okay today.      Objective: See treatment diary below      Assessment: Tolerated treatment well. Patient demonstrated fatigue post treatment and exhibited good technique with therapeutic exercises. VC's needed for correct technique throughout session. No reports of increased pain throughout session.       Plan: Continue per plan of care.        POC expires PT/OT + Visit Limit?   5/15/2025  [facility]       Visit/Unit Tracking  AUTH Status:  Date 3/20 3/24 4/3 4/8      Used 1 2 3 4      Remaining                Precautions: No outstanding precautions.      Manuals 3/20 3/24 4/1 4/3 4/8                                                            Neuro Re-Ed             Patient Ed 15'            Shoulder Isometrics  15x5\" f/ext/ER/IR/ABD 15x5\" 15x5\" ea 15x5\" ea.        Rows  2x10 15# 2x10 15# 2x10 15# 2x10 15#        Shoulder Extensions  2x10 13# 2x10 13# 2x10 13# 2x10 13#        Wall Slides  10x10\" 10x10\" 10x10\" 10\"x10        Prone I/Y/T    2x10 ea RUE 2x10 ea RUE                     Ther Ex             UBE   3/3 3/3 3'/3'        Pulleys  6' 6'  3'        Seated Thoracic Rotations  15x ea b/l 15x ea B 15x ea b/l 15 ea. B/l        BSER  2x10 gtb 2x10 gtb 2x10 gtb 2x10 gtb        Table Slides  20x ea F/scap/ABD 20x ea F/scap/ABD 20x ea f/scap/abd  20 ea. F/scap/abd                                               Ther Activity                                       Gait Training                                       Modalities                                                  "

## 2025-04-15 ENCOUNTER — OFFICE VISIT (OUTPATIENT)
Dept: PHYSICAL THERAPY | Facility: REHABILITATION | Age: 65
End: 2025-04-15
Payer: MEDICARE

## 2025-04-15 DIAGNOSIS — G89.29 CHRONIC RIGHT SHOULDER PAIN: Primary | ICD-10-CM

## 2025-04-15 DIAGNOSIS — M25.511 CHRONIC RIGHT SHOULDER PAIN: Primary | ICD-10-CM

## 2025-04-15 PROCEDURE — 97110 THERAPEUTIC EXERCISES: CPT | Performed by: PHYSICAL THERAPIST

## 2025-04-15 PROCEDURE — 97112 NEUROMUSCULAR REEDUCATION: CPT | Performed by: PHYSICAL THERAPIST

## 2025-04-15 NOTE — PROGRESS NOTES
"Daily Note     Today's date: 4/15/2025  Patient name: Troy Suárez  : 1960  MRN: [id number]  Referring provider: Baldo Medeiros DO  Dx:   Encounter Diagnosis     ICD-10-CM    1. Chronic right shoulder pain  M25.511     G89.29           Start Time: 1740  Stop Time: 1815  Total time in clinic (min): 35 minutes    Subjective: Patient reports no change in status upon arrival.       Objective: See treatment diary below      Assessment: Tolerated treatment well. Patient would benefit from continued OT      Plan: Continue per plan of care.        POC expires PT/OT + Visit Limit?   5/15/2025  [facility]       Visit/Unit Tracking  AUTH Status:  Date 3/20 3/24 4/3 4/8      Used 1 2 3 4      Remaining                Precautions: No outstanding precautions.      Manuals 3/20 3/24 4/1 4/3 4/8 4/15                                                           Neuro Re-Ed             Patient Ed 15'            Shoulder Isometrics  15x5\" f/ext/ER/IR/ABD 15x5\" 15x5\" ea 15x5\" ea. 15x5\" ea       Rows  2x10 15# 2x10 15# 2x10 15# 2x10 15# 2x10 15#       Shoulder Extensions  2x10 13# 2x10 13# 2x10 13# 2x10 13# 2x10 13#       Wall Slides  10x10\" 10x10\" 10x10\" 10\"x10 10\"x10       Prone I/Y/T    2x10 ea RUE 2x10 ea RUE 2x10 ea RUE                    Ther Ex             UBE   3/3 3/3 3'/3' 3'/3'       Pulleys  6' 6'  3' 3'       Seated Thoracic Rotations  15x ea b/l 15x ea B 15x ea b/l 15 ea. B/l 15x ea b/l       BSER  2x10 gtb 2x10 gtb 2x10 gtb 2x10 gtb 2x10 gtb       Table Slides  20x ea F/scap/ABD 20x ea F/scap/ABD 20x ea f/scap/abd  20 ea. F/scap/abd 20 ea F/scap/abd                                              Ther Activity                                       Gait Training                                       Modalities                                                    "

## 2025-04-22 ENCOUNTER — OFFICE VISIT (OUTPATIENT)
Dept: PHYSICAL THERAPY | Facility: REHABILITATION | Age: 65
End: 2025-04-22
Payer: MEDICARE

## 2025-04-22 DIAGNOSIS — M25.511 CHRONIC RIGHT SHOULDER PAIN: Primary | ICD-10-CM

## 2025-04-22 DIAGNOSIS — G89.29 CHRONIC RIGHT SHOULDER PAIN: Primary | ICD-10-CM

## 2025-04-22 DIAGNOSIS — M75.111 INCOMPLETE TEAR OF RIGHT ROTATOR CUFF, UNSPECIFIED WHETHER TRAUMATIC: ICD-10-CM

## 2025-04-22 PROCEDURE — 97110 THERAPEUTIC EXERCISES: CPT

## 2025-04-22 PROCEDURE — 97112 NEUROMUSCULAR REEDUCATION: CPT

## 2025-04-22 NOTE — PROGRESS NOTES
"Daily Note     Today's date: 2025  Patient name: Troy Suárez  : 1960  MRN: [id number]  Referring provider: Baldo Medeiros DO  Dx:   Encounter Diagnosis     ICD-10-CM    1. Chronic right shoulder pain  M25.511     G89.29       2. Incomplete tear of right rotator cuff, unspecified whether traumatic  M75.111           Start Time: 1745  Stop Time: 1815  Total time in clinic (min): 30 minutes    Subjective: Patient reports no change in status upon arrival.       Objective: See treatment diary below      Assessment: Tolerated treatment well. Continued with progressions of existing interventions as charted below with good tolerance. Patient continues to be appropriately challenged at the current therapeutic volume and intensity. We will continue to progress as tolerated. Patient would benefit from continued PT      Plan: Continue per plan of care.        POC expires PT/OT + Visit Limit?   5/15/2025  BOMN       Visit/Unit Tracking  AUTH Status:  Date 3/20 3/24 4/3 4/8 4/15 4/22    Used 1 2 3 4 5 6    Remaining                Precautions: No outstanding precautions.      Manuals 3/20 3/24 4/1 4/3 4/8 4/15 4/22                                                          Neuro Re-Ed             Patient Ed 15'            Shoulder Isometrics  15x5\" f/ext/ER/IR/ABD 15x5\" 15x5\" ea 15x5\" ea. 15x5\" ea 15x5\" ea      Rows  2x10 15# 2x10 15# 2x10 15# 2x10 15# 2x10 15# 2x10 5\"      Shoulder Extensions  2x10 13# 2x10 13# 2x10 13# 2x10 13# 2x10 13# 2x10 13#      Wall Slides  10x10\" 10x10\" 10x10\" 10\"x10 10\"x10 10\"x10      Prone I/Y/T    2x10 ea RUE 2x10 ea RUE 2x10 ea RUE 2x10 ea RUE      Sup pro       2x10 3\" holds cane                   Ther Ex             UBE   3/3 3/3 3'/3' 3'/3' 3'/3'      Pulleys  6' 6'  3' 3' 3'      Seated Thoracic Rotations  15x ea b/l 15x ea B 15x ea b/l 15 ea. B/l 15x ea b/l 15x ea b/l      BSER  2x10 gtb 2x10 gtb 2x10 gtb 2x10 gtb 2x10 gtb 2x10 gtb      Table Slides  20x ea F/scap/ABD 20x ea " F/scap/ABD 20x ea f/scap/abd  20 ea. F/scap/abd 20 ea F/scap/abd 20 ea F/scap/abd                                             Ther Activity                                       Gait Training                                       Modalities

## 2025-04-29 ENCOUNTER — OFFICE VISIT (OUTPATIENT)
Dept: PHYSICAL THERAPY | Facility: REHABILITATION | Age: 65
End: 2025-04-29
Payer: MEDICARE

## 2025-04-29 DIAGNOSIS — M25.511 CHRONIC RIGHT SHOULDER PAIN: Primary | ICD-10-CM

## 2025-04-29 DIAGNOSIS — M75.111 INCOMPLETE TEAR OF RIGHT ROTATOR CUFF, UNSPECIFIED WHETHER TRAUMATIC: ICD-10-CM

## 2025-04-29 DIAGNOSIS — G89.29 CHRONIC RIGHT SHOULDER PAIN: Primary | ICD-10-CM

## 2025-04-29 PROCEDURE — 97112 NEUROMUSCULAR REEDUCATION: CPT

## 2025-04-29 PROCEDURE — 97110 THERAPEUTIC EXERCISES: CPT

## 2025-04-29 NOTE — PROGRESS NOTES
"Daily Note     Today's date: 2025  Patient name: Troy Suárez  : 1960  MRN: [id number]  Referring provider: Baldo Medeiros DO  Dx:   Encounter Diagnosis     ICD-10-CM    1. Chronic right shoulder pain  M25.511     G89.29       2. Incomplete tear of right rotator cuff, unspecified whether traumatic  M75.111             Start Time: 1745  Stop Time:   Total time in clinic (min): 32 minutes    Subjective: Patient reports his shoulder is better than before. He reports no pain present today.   used throughout today's session.       Objective: See treatment diary below      Assessment: Tolerated treatment well. Continued with current plan of care and interventions as charted below. Patient is appropriately challenged. Good form of exercises noted. We will continue to progress as tolerated. Patient would benefit from continued PT      Plan: Continue per plan of care.        POC expires PT/OT + Visit Limit?   5/15/2025  BOMN       Visit/Unit Tracking  AUTH Status:  Date 3/20 3/24 4/3 4/8 4/15 4/22 4/29    Used 1 2 3 4 5 6 7    Remaining                 Precautions: No outstanding precautions.      Manuals 3/20 3/24 4/1 4/3 4/8 4/15 4/22 4/29                                                          Neuro Re-Ed             Patient Ed 15'            Shoulder Isometrics  15x5\" f/ext/ER/IR/ABD 15x5\" 15x5\" ea 15x5\" ea. 15x5\" ea 15x5\" ea 15x5\" ea     Rows  2x10 15# 2x10 15# 2x10 15# 2x10 15# 2x10 15# 2x10 5\" 2x10 15#     Shoulder Extensions  2x10 13# 2x10 13# 2x10 13# 2x10 13# 2x10 13# 2x10 13# 2x10 13#     Wall Slides  10x10\" 10x10\" 10x10\" 10\"x10 10\"x10 10\"x10 10\"x10     Prone I/Y/T    2x10 ea RUE 2x10 ea RUE 2x10 ea RUE 2x10 ea RUE 2x10 ea RUE     Sup pro       2x10 3\" holds cane 2x10 3\" holds cane                  Ther Ex             UBE   3/3 3/3 3'/3' 3'/3' 3'/3' 3'/3'     Pulleys  6' 6'  3' 3' 3' 3'     Seated Thoracic Rotations  15x ea b/l 15x ea B 15x ea b/l 15 ea. B/l 15x ea b/l 15x " ea b/l 15x ea b/l     BSER  2x10 gtb 2x10 gtb 2x10 gtb 2x10 gtb 2x10 gtb 2x10 gtb 2x10 gtb     Table Slides  20x ea F/scap/ABD 20x ea F/scap/ABD 20x ea f/scap/abd  20 ea. F/scap/abd 20 ea F/scap/abd 20 ea F/scap/abd 20 ea F/scap/abd                                            Ther Activity                                       Gait Training                                       Modalities

## 2025-05-06 ENCOUNTER — OFFICE VISIT (OUTPATIENT)
Dept: PHYSICAL THERAPY | Facility: REHABILITATION | Age: 65
End: 2025-05-06
Payer: MEDICARE

## 2025-05-06 DIAGNOSIS — M75.111 INCOMPLETE TEAR OF RIGHT ROTATOR CUFF, UNSPECIFIED WHETHER TRAUMATIC: ICD-10-CM

## 2025-05-06 DIAGNOSIS — M25.511 CHRONIC RIGHT SHOULDER PAIN: Primary | ICD-10-CM

## 2025-05-06 DIAGNOSIS — G89.29 CHRONIC RIGHT SHOULDER PAIN: Primary | ICD-10-CM

## 2025-05-06 PROCEDURE — 97112 NEUROMUSCULAR REEDUCATION: CPT | Performed by: PHYSICAL THERAPIST

## 2025-05-06 PROCEDURE — 97110 THERAPEUTIC EXERCISES: CPT | Performed by: PHYSICAL THERAPIST

## 2025-05-06 NOTE — PROGRESS NOTES
"Daily Note     Today's date: 2025  Patient name: Troy Suárez  : 1960  MRN: [id number]  Referring provider: Baldo Medeiros DO  Dx:   Encounter Diagnosis     ICD-10-CM    1. Chronic right shoulder pain  M25.511     G89.29       2. Incomplete tear of right rotator cuff, unspecified whether traumatic  M75.111                      Subjective: Pt reports that his shoulder is feeling better.       Objective: See treatment diary below      Assessment: Tolerated treatment well. Patient demonstrated fatigue post treatment, exhibited good technique with therapeutic exercises, and would benefit from continued PT.       Plan: Continue per plan of care.  Progress treatment as tolerated.         POC expires PT/OT + Visit Limit?   5/15/2025  BOMN       Visit/Unit Tracking  AUTH Status:  Date 3/20 3/24 4/3 4/8 4/15 4/22 4/29 5/6    Used 1 2 3 4 5 6 7 8    Remaining                  Precautions: No outstanding precautions. Needs        Manuals 3/20 3/24 4/1 4/3 4/8 4/15 4/22 4/29  5                                                        Neuro Re-Ed             Patient Ed 15'            Shoulder Isometrics  15x5\" f/ext/ER/IR/ABD 15x5\" 15x5\" ea 15x5\" ea. 15x5\" ea 15x5\" ea 15x5\" ea 15x5\" ea    Rows  2x10 15# 2x10 15# 2x10 15# 2x10 15# 2x10 15# 2x10 5\" 2x10 15# 2x10 15#    Shoulder Extensions  2x10 13# 2x10 13# 2x10 13# 2x10 13# 2x10 13# 2x10 13# 2x10 13# 2x10 13#    Wall Slides  10x10\" 10x10\" 10x10\" 10\"x10 10\"x10 10\"x10 10\"x10 10\"x10    Prone I/Y/T    2x10 ea RUE 2x10 ea RUE 2x10 ea RUE 2x10 ea RUE 2x10 ea RUE 2x10 ea RUE    Sup pro       2x10 3\" holds cane 2x10 3\" holds cane 2x10 3\" holds cane                 Ther Ex             UBE   3/3 3/3 3'/3' 3'/3' 3'/3' 3'/3' 3'/3'    Pulleys  6' 6'  3' 3' 3' 3' 3'    Seated Thoracic Rotations  15x ea b/l 15x ea B 15x ea b/l 15 ea. B/l 15x ea b/l 15x ea b/l 15x ea b/l 15x ea b/l    BSER  2x10 gtb 2x10 gtb 2x10 gtb 2x10 gtb 2x10 gtb 2x10 gtb 2x10 gtb 2x10 gtb  "   Table Slides  20x ea F/scap/ABD 20x ea F/scap/ABD 20x ea f/scap/abd  20 ea. F/scap/abd 20 ea F/scap/abd 20 ea F/scap/abd 20 ea F/scap/abd 20 ea F/scap/abd                                           Ther Activity                                       Gait Training                                       Modalities

## 2025-07-19 ENCOUNTER — APPOINTMENT (OUTPATIENT)
Dept: LAB | Facility: HOSPITAL | Age: 65
End: 2025-07-19
Payer: MEDICARE

## 2025-08-13 ENCOUNTER — OFFICE VISIT (OUTPATIENT)
Dept: SURGERY | Facility: CLINIC | Age: 65
End: 2025-08-13
Payer: MEDICARE

## 2025-08-20 ENCOUNTER — OFFICE VISIT (OUTPATIENT)
Dept: UROLOGY | Facility: AMBULATORY SURGERY CENTER | Age: 65
End: 2025-08-20
Payer: MEDICARE

## 2025-08-20 ENCOUNTER — TELEPHONE (OUTPATIENT)
Age: 65
End: 2025-08-20

## 2025-08-20 VITALS
WEIGHT: 209 LBS | HEART RATE: 61 BPM | SYSTOLIC BLOOD PRESSURE: 140 MMHG | BODY MASS INDEX: 30.96 KG/M2 | OXYGEN SATURATION: 96 % | HEIGHT: 69 IN | DIASTOLIC BLOOD PRESSURE: 80 MMHG

## 2025-08-20 DIAGNOSIS — R97.20 PSA ELEVATION: Primary | ICD-10-CM

## 2025-08-20 PROCEDURE — 99203 OFFICE O/P NEW LOW 30 MIN: CPT | Performed by: UROLOGY

## 2025-08-20 RX ORDER — DIAZEPAM 10 MG/1
10 TABLET ORAL ONCE
Qty: 1 TABLET | Refills: 0 | Status: SHIPPED | OUTPATIENT
Start: 2025-08-20 | End: 2025-08-20